# Patient Record
Sex: FEMALE | URBAN - METROPOLITAN AREA
[De-identification: names, ages, dates, MRNs, and addresses within clinical notes are randomized per-mention and may not be internally consistent; named-entity substitution may affect disease eponyms.]

---

## 2021-06-02 ENCOUNTER — LAB OUTSIDE AN ENCOUNTER (OUTPATIENT)
Dept: URBAN - METROPOLITAN AREA CLINIC 98 | Facility: CLINIC | Age: 51
End: 2021-06-02

## 2021-06-02 ENCOUNTER — OFFICE VISIT (OUTPATIENT)
Dept: URBAN - METROPOLITAN AREA CLINIC 98 | Facility: CLINIC | Age: 51
End: 2021-06-02
Payer: COMMERCIAL

## 2021-06-02 VITALS
WEIGHT: 183.6 LBS | HEART RATE: 73 BPM | HEIGHT: 69 IN | SYSTOLIC BLOOD PRESSURE: 117 MMHG | BODY MASS INDEX: 27.19 KG/M2 | DIASTOLIC BLOOD PRESSURE: 82 MMHG | TEMPERATURE: 96.9 F

## 2021-06-02 DIAGNOSIS — K62.5 BRBPR (BRIGHT RED BLOOD PER RECTUM): ICD-10-CM

## 2021-06-02 DIAGNOSIS — R42 LIGHTHEADED: ICD-10-CM

## 2021-06-02 DIAGNOSIS — Z12.11 COLON CANCER SCREENING: ICD-10-CM

## 2021-06-02 PROCEDURE — G8420 CALC BMI NORM PARAMETERS: HCPCS | Performed by: INTERNAL MEDICINE

## 2021-06-02 PROCEDURE — G8427 DOCREV CUR MEDS BY ELIG CLIN: HCPCS | Performed by: INTERNAL MEDICINE

## 2021-06-02 PROCEDURE — G8482 FLU IMMUNIZE ORDER/ADMIN: HCPCS | Performed by: INTERNAL MEDICINE

## 2021-06-02 PROCEDURE — G9903 PT SCRN TBCO ID AS NON USER: HCPCS | Performed by: INTERNAL MEDICINE

## 2021-06-02 PROCEDURE — 3017F COLORECTAL CA SCREEN DOC REV: CPT | Performed by: INTERNAL MEDICINE

## 2021-06-02 PROCEDURE — 99204 OFFICE O/P NEW MOD 45 MIN: CPT | Performed by: INTERNAL MEDICINE

## 2021-06-02 RX ORDER — POLYETHYLENE GLYCOL 3350, SODIUM SULFATE, SODIUM CHLORIDE, POTASSIUM CHLORIDE, ASCORBIC ACID, SODIUM ASCORBATE 140-9-5.2G
AS DIRECTED KIT ORAL
Qty: 1 KIT | Refills: 0 | OUTPATIENT

## 2021-06-02 NOTE — HPI-TODAY'S VISIT:
PMH of HTN and HSV-2 On 6/1 she had some lightheadedness  She layed down for a bit and felt better Went to the bathroom.  Felt like she had a normal BM, but noticed blood in the toilet water No abd pain No rectal pain Never had eipsode like this before She went to [facility] (records reviewed).  H&H WNL.  She was d/c to f/u She does report that she went to the bathroom in the ED.  No blood in BM, but did see some blood on TP.   After she got home, she tried to have another bowel movement.  No output , but did sees ome blood in the toilet.  She had a BM this morning without blood. Typically has 2-3 BM/day    The patient presents with 51yo F h/o HTN p/w lightheadedness and blood in her stool early this morning started to feel intermittently lightheaded, no assoc CP or dyspnea or vomiting or diarrhea went to have a Bm and noted bright red blood mixed in her stool not on anticoagulation never had an EGD or colonoscopy not having any abdominal pain

## 2021-06-03 PROBLEM — 74474003: Status: ACTIVE | Noted: 2021-06-02

## 2021-06-10 ENCOUNTER — TELEPHONE ENCOUNTER (OUTPATIENT)
Dept: URBAN - METROPOLITAN AREA CLINIC 98 | Facility: CLINIC | Age: 51
End: 2021-06-10

## 2021-06-11 PROBLEM — 428187007: Status: ACTIVE | Noted: 2021-06-11

## 2021-06-17 ENCOUNTER — OFFICE VISIT (OUTPATIENT)
Dept: URBAN - METROPOLITAN AREA SURGERY CENTER 18 | Facility: SURGERY CENTER | Age: 51
End: 2021-06-17

## 2021-07-19 ENCOUNTER — TELEPHONE ENCOUNTER (OUTPATIENT)
Dept: URBAN - METROPOLITAN AREA CLINIC 92 | Facility: CLINIC | Age: 51
End: 2021-07-19

## 2021-07-19 ENCOUNTER — OFFICE VISIT (OUTPATIENT)
Dept: URBAN - METROPOLITAN AREA TELEHEALTH 2 | Facility: TELEHEALTH | Age: 51
End: 2021-07-19
Payer: COMMERCIAL

## 2021-07-19 DIAGNOSIS — R93.2 ABNORMAL CT OF LIVER: ICD-10-CM

## 2021-07-19 DIAGNOSIS — Z12.11 COLON CANCER SCREENING: ICD-10-CM

## 2021-07-19 PROCEDURE — 99213 OFFICE O/P EST LOW 20 MIN: CPT | Performed by: INTERNAL MEDICINE

## 2021-07-19 RX ORDER — POLYETHYLENE GLYCOL 3350, SODIUM SULFATE, SODIUM CHLORIDE, POTASSIUM CHLORIDE, ASCORBIC ACID, SODIUM ASCORBATE 140-9-5.2G
AS DIRECTED KIT ORAL
Qty: 1 KIT | Refills: 0 | Status: ACTIVE | COMMUNITY

## 2021-07-19 NOTE — HPI-TODAY'S VISIT:
(Dr. Temple May 2021) PMH of HTN and HSV-2 On 6/1 she had some lightheadedness  She layed down for a bit and felt better Went to the bathroom.  Felt like she had a normal BM, but noticed blood in the toilet water No abd pain No rectal pain Never had eipsode like this before She went to [facility] (records reviewed).  H&H WNL.  She was d/c to f/u She does report that she went to the bathroom in the ED.  No blood in BM, but did see some blood on TP.   After she got home, she tried to have another bowel movement.  No output , but did sees ome blood in the toilet.  She had a BM this morning without blood. Typically has 2-3 BM/day    The patient presents with 49yo F h/o HTN p/w lightheadedness and blood in her stool early this morning started to feel intermittently lightheaded, no assoc CP or dyspnea or vomiting or diarrhea went to have a Bm and noted bright red blood mixed in her stool not on anticoagulation never had an EGD or colonoscopy not having any abdominal pain  Today: July 19, 2021 The patient is here for questions and options regarding the colonoscopy.  She would like to have the procedure done without anesthesia.   She had 2 vaginal deliveries without anesthesia.  She would like to review the results of the CT scan ordered by Dr. Mendel.  Rectal bleeding which was a prior complaint has resolved.

## 2021-07-20 ENCOUNTER — WEB ENCOUNTER (OUTPATIENT)
Dept: URBAN - METROPOLITAN AREA CLINIC 17 | Facility: CLINIC | Age: 51
End: 2021-07-20

## 2021-07-20 ENCOUNTER — DASHBOARD ENCOUNTERS (OUTPATIENT)
Age: 51
End: 2021-07-20

## 2021-08-02 ENCOUNTER — TELEPHONE ENCOUNTER (OUTPATIENT)
Dept: URBAN - METROPOLITAN AREA CLINIC 17 | Facility: CLINIC | Age: 51
End: 2021-08-02

## 2021-09-15 ENCOUNTER — OFFICE VISIT (OUTPATIENT)
Dept: URBAN - METROPOLITAN AREA SURGERY CENTER 30 | Facility: SURGERY CENTER | Age: 51
End: 2021-09-15
Payer: COMMERCIAL

## 2021-09-15 DIAGNOSIS — Z12.11 AVERAGE RISK FOR CRC. DUE TO PT'S CO-MORBID STATE WITH END STAGE DEMENTIA, HIGH RISK FOR ANESTHESIA (PER NEUROLOGY); INABILITY TO TAKE A BOWEL PREP....WOULD NOT ADVISE ANY COLORECTAL CANCER SCREENING INCLUDING STOOL TEST FOR FECAL BLOOD.: ICD-10-CM

## 2021-09-15 DIAGNOSIS — Z53.8 FAILED ATTEMPTED SURGICAL PROCEDURE: ICD-10-CM

## 2021-09-15 PROCEDURE — G8907 PT DOC NO EVENTS ON DISCHARG: HCPCS | Performed by: INTERNAL MEDICINE

## 2021-09-15 PROCEDURE — G0121 COLON CA SCRN NOT HI RSK IND: HCPCS | Performed by: INTERNAL MEDICINE
